# Patient Record
Sex: FEMALE | Race: WHITE | NOT HISPANIC OR LATINO | URBAN - METROPOLITAN AREA
[De-identification: names, ages, dates, MRNs, and addresses within clinical notes are randomized per-mention and may not be internally consistent; named-entity substitution may affect disease eponyms.]

---

## 2018-10-19 NOTE — PATIENT DISCUSSION
RECOMMEND 2ND STAGE MOH'S PROCEDURE GLABELLA REGION DISCUSSED RISKS AND BENEFITS WITH THE PATIENT, THE PATIENT UNDERSTANDS AND WILL SCHEDULE AT HER CONVENIENCE.

## 2018-12-07 NOTE — PATIENT DISCUSSION
Patient is healing well following 2nd stage MOHS repair. Sutures removed today without difficulty. Discussed r/b of strataderm gel use twice daily. Follow up in 1-2 months.

## 2019-05-24 NOTE — PATIENT DISCUSSION
RECOMMEND 2ND STAGE MOH'S REPAIR GLABELLA REGION. THE PATIENT UNDERSTANDS THE PROCEDURE AND WILL SCHEDULE AT HER CONVENIENCE.

## 2019-07-23 NOTE — PATIENT DISCUSSION
The patient had a history of skin cancer repair in the glabellar region. She developed a markedly thickened area of redundant tissue and scar tissue from a previous rotational flap. The purpose of the surgery was to revise her redundant glabellar flap to allow a more normal anatomic glabellar region. After obtaining consent the patient received local anesthetic to residual glabellar flap. She was prepared and draped in the usual sterile manner. A rectangle pedicle flap was created measuring 1.5cm in length by 8mm in width. This was elevated off the glabella in the are of the nasal bridge extending onto the upper eyelid on the left side. The flap was undermined w/ lisa scissors and the underlying excess tissue was  resected allowing flap to lie flatly across the skin. The flap perifory was closed with uninterrupted 6.0 nylon sutures. This was done as a direct function of previous skin cancer repair in this area. the patient was then treated w/ antibiotic ointment and given written post operative care instructions.

## 2019-08-09 NOTE — PATIENT DISCUSSION
Patient is continuing to heal well s/p minor in office procedure. Sutures removed today without difficulty. Recommend lubrication as needed. Recommend scar cream.  Follow up in one week for kenalog injection.

## 2019-08-23 ENCOUNTER — IMPORTED ENCOUNTER (OUTPATIENT)
Dept: URBAN - METROPOLITAN AREA CLINIC 1 | Facility: CLINIC | Age: 69
End: 2019-08-23

## 2019-08-23 PROBLEM — H25.813: Noted: 2019-08-23

## 2019-08-23 PROBLEM — H16.143: Noted: 2019-08-23

## 2019-08-23 PROBLEM — H04.123: Noted: 2019-08-23

## 2019-08-23 PROCEDURE — 92004 COMPRE OPH EXAM NEW PT 1/>: CPT

## 2019-08-23 PROCEDURE — 92015 DETERMINE REFRACTIVE STATE: CPT

## 2019-08-23 NOTE — PATIENT DISCUSSION
After informed consent the patient received 0.2 cc of Kenalog 10mg/ml to the glabella region for hypertrophic scarring.

## 2019-08-23 NOTE — PATIENT DISCUSSION
1.  ESTHER w/ PEK OU- Recommend ATs QID OU routinely 2. Cataract OU: Observe for now without intervention. The patient was advised to contact us if any change or worsening of vision MRX for glasses given. Return for an appointment in 6 months 10 (check ks) with Dr. Filemon Huang.

## 2019-10-29 NOTE — PATIENT DISCUSSION
Ocular Hypertension (OHTN) Counseling:  Ocular hypertension is an eye pressure greater than 21mm Hg is measured in one or both eyes on two or more occasions. The presence of elevated intraocular pressure without detectable visual field loss and/or optic nerve damage was discussed with the patient. I have discussed the risks, benefits, and alternatives of treatment as well as the risk factors for glaucoma. The patient understands and agrees with close observation. I have explained to the patient that successful management is dependent on patient compliance with treatment as prescribed and/or regular follow-up to monitor for possible progression.

## 2019-10-29 NOTE — PATIENT DISCUSSION
Surgery Counseling: I have discussed the option of glasses versus cataract surgery versus following. It was explained that when the patients vision no longer meets their visual needs and a glasses prescription does not improve visual symptoms, the option of cataract surgery is a reasonable next step. It was explained that there is no guarantee that removing the cataract will improve their visual symptoms, however; it is believed that the cataract is contributing to the patient's visual impairment and surgery may improve both the visual and functional status of the patient. The risks, benefits and alternatives of surgery were discussed with the patient. After this discussion, the patient desires to proceed with cataract surgery with implantation of an intraocular lens to improve vision for glare.

## 2019-11-13 NOTE — PATIENT DISCUSSION
GLAUCOMA SUSPECT, OU- TODAY'S VISUAL FIELD AND OCT DOES NOT SHOW GLAUCOMATOUS CHANGES. POOR TESTS. REPEAT VISUAL FIELDS OU. THIS CAN BE DONE PRIOR TO OR AFTER SURGERY, WHICHEVER IS MORE CONVENIENT FOR THE PATIENT.

## 2019-11-27 NOTE — PATIENT DISCUSSION
SAME DAY S/P PCIOL, OD- CONTINUE DROPS AS DIRECTED. OPERATIVE EYE IS STABLE AND OK TO PROCEED WITH THE FELLOW EYE'S SURGERY.

## 2019-11-27 NOTE — PATIENT DISCUSSION
CATARACT, OS- VISUALLY SIGNIFICANT. FOLLOW UP WITH DR. LAWSON TO [address] [facility] SX.

## 2019-11-27 NOTE — PATIENT DISCUSSION
***This patient had traditional  cataract surgery performed. A monofocal IOL was placed to achieve a target refraction of  PLANO (which should provide them with satisfactory vision with the aid of glasses/contact lenses). ***

## 2019-12-10 NOTE — PATIENT DISCUSSION
iStent Counseling: I have discussed the option of an iStent micro-invasive glaucoma surgery device at the time of cataract surgery should further evaluation indicate glaucoma is present. Patient understands and will decide on iStent insertion pending visual field results.

## 2019-12-10 NOTE — PATIENT DISCUSSION
DISCUSSED ELEVATED IOP OS-REVIEWED LAST VF AND HISTORICAL IOP. WILL RECOMMEND ISTENT AND PATIENT PATIENT START LUMIGAN AT NIGHT IN THE LEFT EYE.

## 2020-01-21 NOTE — PATIENT DISCUSSION
[address] SUSP OS&gt;OD - OKAY TO CONTINUE WITHOUT GLC MEDICATIONS AT THIS TIME. RTC X 6 MOS FOR HVF 24-2 + OCT RNFL + IOP CHECK.

## 2020-02-20 ENCOUNTER — IMPORTED ENCOUNTER (OUTPATIENT)
Dept: URBAN - METROPOLITAN AREA CLINIC 1 | Facility: CLINIC | Age: 70
End: 2020-02-20

## 2020-02-20 PROBLEM — H04.123: Noted: 2020-02-20

## 2020-02-20 PROBLEM — H16.143: Noted: 2020-02-20

## 2020-02-20 PROBLEM — H25.813: Noted: 2020-02-20

## 2020-02-20 PROCEDURE — 92012 INTRM OPH EXAM EST PATIENT: CPT

## 2020-02-20 NOTE — PATIENT DISCUSSION
1.  ESTHER w/ PEK OU -- PEK and symptoms improved w/ ATs TID-QID routinely. Recommend continue the use of ATs QID OU. In addition patient may use gel ATs QHS OU. Consider plugs w/o improvement. 2. Cataract OU -- Observe. Return for an appointment in 6 months for a 30/glare with Dr. Digna Ferro.

## 2021-09-16 ENCOUNTER — IMPORTED ENCOUNTER (OUTPATIENT)
Dept: URBAN - METROPOLITAN AREA CLINIC 1 | Facility: CLINIC | Age: 71
End: 2021-09-16

## 2021-09-16 PROBLEM — H16.143: Noted: 2021-09-16

## 2021-09-16 PROBLEM — H04.123: Noted: 2021-09-16

## 2021-09-16 PROBLEM — H25.813: Noted: 2021-09-16

## 2021-09-16 PROCEDURE — 99214 OFFICE O/P EST MOD 30 MIN: CPT

## 2021-09-16 PROCEDURE — 92015 DETERMINE REFRACTIVE STATE: CPT

## 2021-09-16 NOTE — PATIENT DISCUSSION
1.  Cataract OU -- Observe for now without intervention. The patient was advised to contact us if any change or worsening of vision. 2.  ESTHER w/ PEK OU -- Pt symptomatic despite complaince w/ ATs. Inserted Silicone Plug RLL and LLL:  Risks benefits and alternatives to placing plug was discussed with patient. Patient understands and would like to proceed. Consent was signed. Post op instructions were discussed/given to patient and patient was advised to call our office if any problems arose. Urged compliance w/ ATs QID OU routinely. Patient may use gel ATs QHS OU in addition. 3.  H/o Ocular Migraine w/ Aura -- Reassurance. Return for an appointment in 6 months for a 10 with Dr. Heaven Cardenas.

## 2021-09-16 NOTE — PATIENT DISCUSSION
ESTHER w/ PEK OU- Pt symptomatic despite tear use: Inserted Silicone Plug RLL and LLL:  Risks benefits and alternatives to placing plug was discussed with patient. Patient understands and would like to proceed. Consent was signed. Post op instructions were discussed/given to patient and patient was advised to call our office if any problems arose. Use ATs QID OU Routinely.

## 2022-03-24 ENCOUNTER — FOLLOW UP (OUTPATIENT)
Dept: URBAN - METROPOLITAN AREA CLINIC 1 | Facility: CLINIC | Age: 72
End: 2022-03-24

## 2022-03-24 DIAGNOSIS — H25.813: ICD-10-CM

## 2022-03-24 DIAGNOSIS — H16.143: ICD-10-CM

## 2022-03-24 DIAGNOSIS — H04.123: ICD-10-CM

## 2022-03-24 PROCEDURE — 92012 INTRM OPH EXAM EST PATIENT: CPT

## 2022-03-24 ASSESSMENT — KERATOMETRY
OS_AXISANGLE_DEGREES: 170
OD_K1POWER_DIOPTERS: 44.00
OD_K2POWER_DIOPTERS: 44.75
OS_K1POWER_DIOPTERS: 44.00
OS_K2POWER_DIOPTERS: 44.75
OD_AXISANGLE2_DEGREES: 077
OD_AXISANGLE_DEGREES: 167
OS_AXISANGLE2_DEGREES: 80

## 2022-03-24 ASSESSMENT — VISUAL ACUITY
OS_BAT: 20/100
OS_SC: 20/25
OD_BAT: 20/100
OD_SC: 20/25

## 2022-03-24 ASSESSMENT — TONOMETRY
OD_IOP_MMHG: 15
OS_IOP_MMHG: 15

## 2022-04-08 ASSESSMENT — TONOMETRY
OD_IOP_MMHG: 18
OD_IOP_MMHG: 17
OS_IOP_MMHG: 18
OD_IOP_MMHG: 16
OS_IOP_MMHG: 16
OS_IOP_MMHG: 18

## 2022-04-08 ASSESSMENT — VISUAL ACUITY
OD_SC: 20/25
OD_GLARE: 20/70
OD_CC: 20/20-2
OD_CC: J1
OS_CC: 20/20-1
OS_SC: 20/20
OS_CC: 20/20-1
OS_CC: J1
OD_CC: 20/20-2
OS_GLARE: 20/70

## 2022-11-11 ENCOUNTER — COMPREHENSIVE EXAM (OUTPATIENT)
Dept: URBAN - METROPOLITAN AREA CLINIC 1 | Facility: CLINIC | Age: 72
End: 2022-11-11

## 2022-11-11 DIAGNOSIS — H16.143: ICD-10-CM

## 2022-11-11 DIAGNOSIS — H04.123: ICD-10-CM

## 2022-11-11 DIAGNOSIS — H25.813: ICD-10-CM

## 2022-11-11 PROCEDURE — 92015 DETERMINE REFRACTIVE STATE: CPT

## 2022-11-11 PROCEDURE — 99214 OFFICE O/P EST MOD 30 MIN: CPT

## 2022-11-11 ASSESSMENT — KERATOMETRY
OD_K2POWER_DIOPTERS: 44.75
OS_AXISANGLE_DEGREES: 170
OS_K2POWER_DIOPTERS: 44.75
OD_AXISANGLE_DEGREES: 167
OD_K1POWER_DIOPTERS: 44.00
OD_AXISANGLE2_DEGREES: 077
OS_K1POWER_DIOPTERS: 44.00
OS_AXISANGLE2_DEGREES: 80

## 2022-11-11 ASSESSMENT — VISUAL ACUITY
OD_SC: 20/20-2
OS_BAT: 20/60
OS_SC: 20/20-2
OU_SC: J2
OD_BAT: 20/60

## 2022-11-11 ASSESSMENT — TONOMETRY
OS_IOP_MMHG: 15
OD_IOP_MMHG: 15

## 2024-02-01 ENCOUNTER — COMPREHENSIVE EXAM (OUTPATIENT)
Dept: URBAN - METROPOLITAN AREA CLINIC 1 | Facility: CLINIC | Age: 74
End: 2024-02-01

## 2024-02-01 DIAGNOSIS — H25.813: ICD-10-CM

## 2024-02-01 DIAGNOSIS — H04.123: ICD-10-CM

## 2024-02-01 DIAGNOSIS — H16.143: ICD-10-CM

## 2024-02-01 PROCEDURE — 92015 DETERMINE REFRACTIVE STATE: CPT

## 2024-02-01 PROCEDURE — 99214 OFFICE O/P EST MOD 30 MIN: CPT

## 2024-02-01 ASSESSMENT — VISUAL ACUITY
OD_CC: J1
OD_SC: 20/20-2
OS_CC: J1
OD_BAT: 20/150
OS_BAT: 20/80
OS_SC: 20/25-1

## 2024-02-01 ASSESSMENT — KERATOMETRY
OD_K1POWER_DIOPTERS: 44.00
OS_AXISANGLE_DEGREES: 170
OS_K1POWER_DIOPTERS: 44.00
OD_AXISANGLE_DEGREES: 167
OD_K2POWER_DIOPTERS: 44.75
OS_AXISANGLE2_DEGREES: 80
OD_AXISANGLE2_DEGREES: 077
OS_K2POWER_DIOPTERS: 44.75

## 2024-02-01 ASSESSMENT — TONOMETRY
OD_IOP_MMHG: 14
OS_IOP_MMHG: 13

## 2024-08-27 ENCOUNTER — FOLLOW UP (OUTPATIENT)
Dept: URBAN - METROPOLITAN AREA CLINIC 1 | Facility: CLINIC | Age: 74
End: 2024-08-27

## 2024-08-27 DIAGNOSIS — H16.143: ICD-10-CM

## 2024-08-27 DIAGNOSIS — H04.123: ICD-10-CM

## 2024-08-27 DIAGNOSIS — H25.813: ICD-10-CM

## 2024-08-27 PROCEDURE — 99213 OFFICE O/P EST LOW 20 MIN: CPT

## 2024-08-27 PROCEDURE — 92015 DETERMINE REFRACTIVE STATE: CPT

## 2024-08-27 ASSESSMENT — VISUAL ACUITY
OD_SC: J7
OS_BAT: 20/80
OD_BAT: 20/150
OS_SC: J7
OS_SC: 20/25+2
OD_SC: 20/25-2

## 2024-08-27 ASSESSMENT — KERATOMETRY
OD_AXISANGLE2_DEGREES: 077
OS_K1POWER_DIOPTERS: 44.00
OD_AXISANGLE_DEGREES: 167
OS_AXISANGLE2_DEGREES: 80
OS_AXISANGLE_DEGREES: 170
OS_K2POWER_DIOPTERS: 44.75
OD_K2POWER_DIOPTERS: 44.75
OD_K1POWER_DIOPTERS: 44.00

## 2024-08-27 ASSESSMENT — TONOMETRY
OD_IOP_MMHG: 14
OS_IOP_MMHG: 14